# Patient Record
Sex: FEMALE | Race: WHITE | Employment: UNEMPLOYED | URBAN - METROPOLITAN AREA
[De-identification: names, ages, dates, MRNs, and addresses within clinical notes are randomized per-mention and may not be internally consistent; named-entity substitution may affect disease eponyms.]

---

## 2019-09-07 ENCOUNTER — HOSPITAL ENCOUNTER (EMERGENCY)
Age: 29
Discharge: HOME | End: 2019-09-07
Payer: COMMERCIAL

## 2019-09-07 VITALS
OXYGEN SATURATION: 99 % | TEMPERATURE: 96.2 F | HEART RATE: 73 BPM | RESPIRATION RATE: 17 BRPM | SYSTOLIC BLOOD PRESSURE: 113 MMHG | DIASTOLIC BLOOD PRESSURE: 75 MMHG

## 2019-09-07 DIAGNOSIS — J06.9 VIRAL URI WITH COUGH: Primary | ICD-10-CM

## 2019-09-07 PROCEDURE — 99282 EMERGENCY DEPT VISIT SF MDM: CPT

## 2019-09-07 RX ORDER — FLUTICASONE PROPIONATE 50 MCG
1 SPRAY, SUSPENSION (ML) NASAL DAILY
Qty: 1 BOTTLE | Refills: 0 | Status: SHIPPED | OUTPATIENT
Start: 2019-09-07

## 2019-09-07 RX ORDER — GUAIFENESIN/DEXTROMETHORPHAN 100-10MG/5
5 SYRUP ORAL 3 TIMES DAILY PRN
Qty: 120 ML | Refills: 0 | Status: SHIPPED | OUTPATIENT
Start: 2019-09-07 | End: 2019-09-17

## 2019-09-07 RX ORDER — PSEUDOEPHEDRINE HYDROCHLORIDE 30 MG/1
60 TABLET ORAL EVERY 8 HOURS PRN
Qty: 12 TABLET | Refills: 0 | Status: SHIPPED | OUTPATIENT
Start: 2019-09-07 | End: 2019-09-14

## 2019-09-07 NOTE — ED PROVIDER NOTES
[facility]  HPI: Karis Knight is a 34 y.o. female with a past medical history of  has a past medical history of Anemia and Headache(784.0). presenting with complaints of a cough with nasal congestion. The patient states that these symptoms began gradually. The history is obtained from the patient. The patient states that he has had some subjective chills at home. Patient does complain of a mild cough associated with it that is nonproductive. Patient denies excessive fatigue or sleeping greater than 18 hours a day. Patient denies exposure to mononucleosis. The patient denies any abdominal pain, left upper quadrant fullness, or early satiety. The patient also denies difficulty breathing, hemoptysis, neck pain/stiffness, or blurry vision. Sx have persisted and are mildly worse which is what prompted the visit today. ? exposure to sick contacts.        Review of Systems:   Pertinent positives and negatives are stated within HPI, all other systems reviewed and are negative.          --------------------------------------------- PAST HISTORY ---------------------------------------------  Past Medical History:  has a past medical history of Anemia and Headache(784.0). Past Surgical History:  has a past surgical history that includes  section (, ). Social History:  reports that she has never smoked. She has never used smokeless tobacco. She reports that she does not drink alcohol or use drugs. Family History: family history includes Asthma in her maternal grandfather, maternal grandmother, paternal grandfather, paternal grandmother, and sister. The patients home medications have been reviewed. Allergies: Patient has no known allergies. ------------------------- NURSING NOTES AND VITALS REVIEWED ---------------------------   The nursing notes within the ED encounter and vital signs as below have been reviewed by myself.   /75   Pulse 73   Temp 96.2 °F (35.7 °C)

## 2022-05-13 ENCOUNTER — OFFICE VISIT (OUTPATIENT)
Dept: ONCOLOGY | Age: 32
End: 2022-05-13
Payer: COMMERCIAL

## 2022-05-13 ENCOUNTER — HOSPITAL ENCOUNTER (OUTPATIENT)
Dept: INFUSION THERAPY | Age: 32
Discharge: HOME | End: 2022-05-13
Payer: COMMERCIAL

## 2022-05-13 VITALS
HEART RATE: 70 BPM | DIASTOLIC BLOOD PRESSURE: 62 MMHG | HEIGHT: 61 IN | WEIGHT: 154 LBS | RESPIRATION RATE: 16 BRPM | BODY MASS INDEX: 29.07 KG/M2 | TEMPERATURE: 98.4 F | SYSTOLIC BLOOD PRESSURE: 108 MMHG

## 2022-05-13 DIAGNOSIS — D50.0 IRON DEFICIENCY ANEMIA DUE TO CHRONIC BLOOD LOSS: Primary | ICD-10-CM

## 2022-05-13 DIAGNOSIS — D50.0 IRON DEFICIENCY ANEMIA DUE TO CHRONIC BLOOD LOSS: ICD-10-CM

## 2022-05-13 LAB
ANISOCYTOSIS: ABNORMAL
BASOPHILS ABSOLUTE: 0.09 E9/L (ref 0–0.2)
BASOPHILS RELATIVE PERCENT: 1.7 % (ref 0–2)
EOSINOPHILS ABSOLUTE: 0.1 E9/L (ref 0.05–0.5)
EOSINOPHILS RELATIVE PERCENT: 1.8 % (ref 0–6)
FERRITIN: 8 NG/ML
FOLATE: 10.8 NG/ML (ref 4.8–24.2)
HCT VFR BLD CALC: 32.1 % (ref 34–48)
HEMOGLOBIN: 9.7 G/DL (ref 11.5–15.5)
IRON SATURATION: 45 % (ref 15–50)
IRON: 172 MCG/DL (ref 37–145)
LYMPHOCYTES ABSOLUTE: 2.42 E9/L (ref 1.5–4)
LYMPHOCYTES RELATIVE PERCENT: 44.4 % (ref 20–42)
MCH RBC QN AUTO: 22.1 PG (ref 26–35)
MCHC RBC AUTO-ENTMCNC: 30.2 % (ref 32–34.5)
MCV RBC AUTO: 73.3 FL (ref 80–99.9)
MONOCYTES ABSOLUTE: 0.11 E9/L (ref 0.1–0.95)
MONOCYTES RELATIVE PERCENT: 1.7 % (ref 2–12)
NEUTROPHILS ABSOLUTE: 2.75 E9/L (ref 1.8–7.3)
NEUTROPHILS RELATIVE PERCENT: 50.4 % (ref 43–80)
OVALOCYTES: ABNORMAL
PATHOLOGIST REVIEW: NORMAL
PDW BLD-RTO: ABNORMAL FL (ref 11.5–15)
PLATELET # BLD: 315 E9/L (ref 130–450)
PMV BLD AUTO: 9.4 FL (ref 7–12)
POIKILOCYTES: ABNORMAL
POLYCHROMASIA: ABNORMAL
RBC # BLD: 4.38 E12/L (ref 3.5–5.5)
TEAR DROP CELLS: ABNORMAL
TOTAL IRON BINDING CAPACITY: 379 MCG/DL (ref 250–450)
VITAMIN B-12: 534 PG/ML (ref 211–946)
WBC # BLD: 5.5 E9/L (ref 4.5–11.5)

## 2022-05-13 PROCEDURE — G8427 DOCREV CUR MEDS BY ELIG CLIN: HCPCS | Performed by: INTERNAL MEDICINE

## 2022-05-13 PROCEDURE — 99205 OFFICE O/P NEW HI 60 MIN: CPT | Performed by: INTERNAL MEDICINE

## 2022-05-13 PROCEDURE — 36415 COLL VENOUS BLD VENIPUNCTURE: CPT

## 2022-05-13 PROCEDURE — 99214 OFFICE O/P EST MOD 30 MIN: CPT

## 2022-05-13 PROCEDURE — G8419 CALC BMI OUT NRM PARAM NOF/U: HCPCS | Performed by: INTERNAL MEDICINE

## 2022-05-13 PROCEDURE — 1036F TOBACCO NON-USER: CPT | Performed by: INTERNAL MEDICINE

## 2022-05-13 NOTE — PROGRESS NOTES
[address]  [facility] MED ONCOLOGY  [address] [facility] [address]  Dept: [address] Regina Nunes: [phone]  Attending Consult Note      Reason for Visit:   Anemia. Referring Physician:  SUNSHINE Culver    PCP:  Amber Carmona MD (Inactive)    History of Present Illness:     Kristan Sheehan is a very pleasant 31-year-old lady, healthy, who was referred to the hematology office for anemia, the patient had a CBCD done on 2022, her hemoglobin was 7.1, MCV 65.8, ferritin 1, iron 12, TIBC 449, TSAT 3%. The patient has heavy periods, she is feeling tired. No melena or hematochezia. She is on oral iron which is causing nausea. Review of Systems;  CONSTITUTIONAL: No fever, chills. Good appetite, feeling tired. ENMT: Eyes: No diplopia; Nose: No epistaxis. Mouth: No sore throat. RESPIRATORY: No hemoptysis, shortness of breath, cough. CARDIOVASCULAR: Positive for noncardiac chest pain, palpitations. GASTROINTESTINAL: Pos for  nausea, no vomiting, abdominal pain, diarrhea/constipation. GENITOURINARY: No dysuria, urinary frequency, hematuria. NEURO: No syncope, presyncope, headache. Remainder:  ROS NEGATIVE    Past Medical History:      Diagnosis Date    Anemia     Headache(784.0)      Patient Active Problem List   Diagnosis    Acne    Anemia        Past Surgical History:      Procedure Laterality Date     SECTION  ,     2 times       Family History:  Family History   Problem Relation Age of Onset    Asthma Sister     Asthma Maternal Grandmother     Asthma Maternal Grandfather     Asthma Paternal Grandmother     Asthma Paternal Grandfather        Medications:  Reviewed and reconciled.     Social History:  Social History     Socioeconomic History    Marital status: Single     Spouse name: Not on file    Number of children: Not on file    Years of education: Not on file    Highest education level: Not on file   Occupational History    Not on file Tobacco Use    Smoking status: Never Smoker    Smokeless tobacco: Never Used   Substance and Sexual Activity    Alcohol use: No    Drug use: No    Sexual activity: Not on file   Other Topics Concern    Not on file   Social History Narrative    Not on file     Social Determinants of Health     Financial Resource Strain:     Difficulty of Paying Living Expenses: Not on file   Food Insecurity:     Worried About Running Out of Food in the Last Year: Not on file    Rebekah of Food in the Last Year: Not on file   Transportation Needs:     Lack of Transportation (Medical): Not on file    Lack of Transportation (Non-Medical): Not on file   Physical Activity:     Days of Exercise per Week: Not on file    Minutes of Exercise per Session: Not on file   Stress:     Feeling of Stress : Not on file   Social Connections:     Frequency of Communication with Friends and Family: Not on file    Frequency of Social Gatherings with Friends and Family: Not on file    Attends Synagogue Services: Not on file    Active Member of [address] [place] [address] or Organizations: Not on file    Attends Club or Organization Meetings: Not on file    Marital Status: Not on file   Intimate Partner Violence:     Fear of Current or Ex-Partner: Not on file    Emotionally Abused: Not on file    Physically Abused: Not on file    Sexually Abused: Not on file   Housing Stability:     Unable to Pay for Housing in the Last Year: Not on file    Number of [place] in the Last Year: Not on file    Unstable Housing in the Last Year: Not on file       Allergies:  No Known Allergies    Physical Exam:  /62 (Site: Right Upper Arm, Position: Sitting, Cuff Size: Medium Adult)   Pulse 70   Temp 98.4 °F (36.9 °C) (Infrared)   Resp 16   Ht 5' 1\" (1.549 m)   Wt 154 lb (69.9 kg)   BMI 29.10 kg/m²   GENERAL: Alert, oriented x 3, not in acute distress. HEENT: PERRLA; EOMI. Oropharynx clear. NECK: Supple.  No palpable cervical or supraclavicular lymphadenopathy. LUNGS: Good air entry bilaterally. No wheezing, crackles or rhonchi. CARDIOVASCULAR: Regular rate. No murmurs, rubs or gallops. ABDOMEN: Soft. Non-tender, non-distended. Positive bowel sounds. EXTREMITIES: Without clubbing, cyanosis, or edema. NEUROLOGIC: No focal deficits. ECOG PS 1        Impression/Plan:      Jay Jay Davenport is a very pleasant 75-year-old lady, healthy, who was referred to the hematology office for anemia, the patient had a CBCD done on 4/11/2022, her hemoglobin was 7.1, MCV 65.8, ferritin 1, iron 12, TIBC 449, TSAT 3%. The patient has heavy periods, she is feeling tired. No melena or hematochezia. She is on oral iron which is causing nausea. The patient has microcytic anemia secondary to iron deficiency from heavy menstrual bleeding, I discussed with the patient referral to GYN, she would like to hold off on seeing GYN at this time, she will be due for a Pap smear in 6 months, she would like to be seen at that time. The patient has nausea from the oral iron, recommended parenteral iron infusion, Feraheme x2 doses 1 week apart. Fit test was ordered to rule out GI bleed, celiac serology was ordered. RTC with the second Feraheme dose. Thank you for allowing us to participate in the care of Ms. Devon Drew.     Samantha Clarke MD   HEMATOLOGY/MEDICAL [facility]  [address] MED ONCOLOGY  [address] [place] [id number]  Dept:  Regina Nunes: [phone]

## 2022-05-13 NOTE — PROGRESS NOTES
Steven Head  1990 32 y.o. Referring Physician:     PCP: Sulema Pompa MD (Inactive)    Vitals:    22 0942   BP: 108/62   Pulse: 70   Resp: 16   Temp: 98.4 °F (36.9 °C)        Wt Readings from Last 3 Encounters:   22 154 lb (69.9 kg)   05/15/12 152 lb (68.9 kg)   12 144 lb (65.3 kg)        Body mass index is 29.1 kg/m². Chief Complaint:   Chief Complaint   Patient presents with   [facility] Patient    Anemia     kari           LMP: 22    Age at first Menses: 6    : 2    Para: 2          Current Outpatient Medications:     fluticasone (FLONASE) 50 MCG/ACT nasal spray, 1 spray by Each Nostril route daily, Disp: 1 Bottle, Rfl: 0    ibuprofen (ADVIL;MOTRIN) 800 MG tablet, Take 1 tablet by mouth every 6 hours as needed for Pain., Disp: 30 tablet, Rfl: 0    ferrous sulfate (FE TABS) 325 (65 FE) MG EC tablet, Take 1 tablet by mouth 3 times daily (with meals). , Disp: 90 tablet, Rfl: 3       Past Medical History:   Diagnosis Date    Anemia     Headache(784.0)        Past Surgical History:   Procedure Laterality Date     SECTION  ,     2 times       Family History   Problem Relation Age of Onset    Asthma Sister     Asthma Maternal Grandmother     Asthma Maternal Grandfather     Asthma Paternal Grandmother     Asthma Paternal Grandfather        Social History     Socioeconomic History    Marital status: Single     Spouse name: Not on file    Number of children: Not on file    Years of education: Not on file    Highest education level: Not on file   Occupational History    Not on file   Tobacco Use    Smoking status: Never Smoker    Smokeless tobacco: Never Used   Substance and Sexual Activity    Alcohol use: No    Drug use: No    Sexual activity: Not on file   Other Topics Concern    Not on file   Social History Narrative    Not on file     Social Determinants of Health     Financial Resource Strain:     Difficulty of Paying Living Expenses: Not on file   Food Insecurity:     Worried About Running Out of Food in the Last Year: Not on file    Rebekah of Food in the Last Year: Not on file   Transportation Needs:     Lack of Transportation (Medical): Not on file    Lack of Transportation (Non-Medical): Not on file   Physical Activity:     Days of Exercise per Week: Not on file    Minutes of Exercise per Session: Not on file   Stress:     Feeling of Stress : Not on file   Social Connections:     Frequency of Communication with Friends and Family: Not on file    Frequency of Social Gatherings with Friends and Family: Not on file    Attends Latter day Services: Not on file    Active Member of [address] [place] [address] or Organizations: Not on file    Attends Club or Organization Meetings: Not on file    Marital Status: Not on file   Intimate Partner Violence:     Fear of Current or Ex-Partner: Not on file    Emotionally Abused: Not on file    Physically Abused: Not on file    Sexually Abused: Not on file   Housing Stability:     Unable to Pay for Housing in the Last Year: Not on file    Number of [place] in the Last Year: Not on file    Unstable Housing in the Last Year: Not on file           Occupation: n/a  Retired:  NO          REVIEW OF SYSTEMS:    Pacemaker/Defibulator/ICD:  No    Mediport: No           FALLS RISK SCREENING ASSESSMENT    Instructions:  Assess the patient and [place] the appropriate indicators that are present for fall risk identification. Total the numbers circled and assign a fall risk score from Table 2.  Reassess patient at a minimum every 12 weeks or with status change. Assessment   Date  5/13/2022     1. Mental Ability: confusion/cognitively impaired No - 0       2. Elimination Issues: incontinence, frequency No - 0       3. Ambulatory: use of assistive devices (walker, cane, off-loading devices), attached to equipment (IV pole, oxygen) No - 0     4.   Sensory Limitations: dizziness, vertigo, impaired vision Yes-2       5. Age Less than 65 years - 0       6. Medication: diuretics, strong analgesics, hypnotics, sedatives, antihypertensive agents   No - 0   7. Falls:  recent history of falls within the last 3 months (not to include slipping or tripping)   No - 0   TOTAL 2    If score of 4 or greater was education given? No       TABLE 2   Risk Score Risk Level Plan of Care   0-3 Little or  No Risk 1. Provide assistance as indicated for ambulation activities  2. Reorient confused/cognitively impaired patient  3. Call-light/bell within patient's reach  4. Chair/bed in low position, stretcher/bed with siderails up except when performing patient care activities  5. Educate patient/family/caregiver on falls prevention  6.  Reassess in 12 weeks or with any noted change in patient condition which places them at a risk for a fall   4-6 Moderate Risk 1. Provide assistance as indicated for ambulation activities  2. Reorient confused/cognitively impaired patient  3. Call-light/bell within patient's reach  4. Chair/bed in low position, stretcher/bed with siderails up except when performing patient care activities  5. Educate patient/family/caregiver on falls prevention  6. Falls risk precaution (Yellow sticker Level II) placed on patient chart   7 or   Higher High Risk 1. Place patient in easily observable treatment room  2. Patient attended at all times by family member or staff  3. Provide assistance as indicated for ambulation activities  4. Reorient confused/cognitively impaired patient  5. Call-light/bell within patient's reach  6. Chair/bed in low position, stretcher/bed with siderails up except when performing patient care activities  7. Educate patient/family/caregiver on falls prevention  8.   Falls risk precaution (Yellow sticker Level III) placed on patient chart                     Sara Mcbride RN